# Patient Record
Sex: FEMALE | URBAN - METROPOLITAN AREA
[De-identification: names, ages, dates, MRNs, and addresses within clinical notes are randomized per-mention and may not be internally consistent; named-entity substitution may affect disease eponyms.]

---

## 2018-02-01 ENCOUNTER — APPOINTMENT (OUTPATIENT)
Age: 68
Setting detail: DERMATOLOGY
End: 2018-02-01

## 2018-02-01 DIAGNOSIS — Z41.9 ENCOUNTER FOR PROCEDURE FOR PURPOSES OTHER THAN REMEDYING HEALTH STATE, UNSPECIFIED: ICD-10-CM

## 2018-02-01 PROCEDURE — OTHER FILLERS (CC): OTHER

## 2019-02-21 ENCOUNTER — APPOINTMENT (OUTPATIENT)
Age: 69
Setting detail: DERMATOLOGY
End: 2019-02-21

## 2019-02-21 DIAGNOSIS — Z41.9 ENCOUNTER FOR PROCEDURE FOR PURPOSES OTHER THAN REMEDYING HEALTH STATE, UNSPECIFIED: ICD-10-CM

## 2019-02-21 PROCEDURE — OTHER ADDITIONAL NOTES: OTHER

## 2019-02-21 PROCEDURE — OTHER JUVEDERM ULTRA PLUS XC INJECTION: OTHER

## 2019-02-21 ASSESSMENT — LOCATION DETAILED DESCRIPTION DERM
LOCATION DETAILED: LEFT LOWER CUTANEOUS LIP
LOCATION DETAILED: LEFT CENTRAL BUCCAL CHEEK
LOCATION DETAILED: RIGHT CENTRAL BUCCAL CHEEK
LOCATION DETAILED: RIGHT MEDIAL BUCCAL CHEEK

## 2019-02-21 ASSESSMENT — LOCATION SIMPLE DESCRIPTION DERM
LOCATION SIMPLE: RIGHT CHEEK
LOCATION SIMPLE: LEFT CHEEK
LOCATION SIMPLE: LEFT LIP

## 2019-02-21 ASSESSMENT — LOCATION ZONE DERM
LOCATION ZONE: FACE
LOCATION ZONE: LIP

## 2019-02-21 NOTE — PROCEDURE: ADDITIONAL NOTES
Additional Notes: Suggested  cheeks as well to help marionette lines. Pt declined and would like filler right in lower face region.
Detail Level: Simple

## 2019-02-21 NOTE — PROCEDURE: JUVEDERM ULTRA PLUS XC INJECTION
Anesthesia Type: 1% lidocaine with epinephrine
[place] Filler  Volume In Cc: 0
Use Map Statement For Sites (Optional): No
Topical Anesthesia?: LMX
Procedural Text: The filler was administered to the treatment areas noted above.
Map Statment: See Attach Map for Complete Details
Filler: Juvederm Ultra Plus XC
Anesthesia Volume In Cc: 0.5
Consent: Written consent obtained. Risks include but not limited to bruising, beading, irregular texture, ulceration, infection, allergic reaction, scar formation, incomplete augmentation, temporary nature, procedural pain.
Additional Anesthesia Volume In Cc: 6
Detail Level: Detailed
Post-Care Instructions: Patient instructed to apply ice to reduce swelling.

## 2021-05-05 ENCOUNTER — APPOINTMENT (OUTPATIENT)
Dept: URBAN - METROPOLITAN AREA CLINIC 285 | Age: 71
Setting detail: DERMATOLOGY
End: 2021-05-05

## 2021-05-05 DIAGNOSIS — Z41.9 ENCOUNTER FOR PROCEDURE FOR PURPOSES OTHER THAN REMEDYING HEALTH STATE, UNSPECIFIED: ICD-10-CM

## 2021-05-05 PROCEDURE — OTHER ADDITIONAL NOTES: OTHER

## 2021-05-05 PROCEDURE — OTHER MIPS QUALITY: OTHER

## 2021-05-05 PROCEDURE — OTHER COSMETIC CONSULTATION: FILLERS: OTHER

## 2021-05-05 NOTE — PROCEDURE: ADDITIONAL NOTES
Render Risk Assessment In Note?: no
Additional Notes: Discussed in great detail the risk and benefits of filler after a recent COVID-19 vaccine including but not limited to edema, erythema, foreign body reaction and vasoconstriction. She just had her last vaccine about 3 weeks ago, didn't know the exact date. 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
Detail Level: Simple

## 2021-08-12 ENCOUNTER — APPOINTMENT (OUTPATIENT)
Dept: URBAN - METROPOLITAN AREA CLINIC 285 | Age: 71
Setting detail: DERMATOLOGY
End: 2021-08-12

## 2021-08-12 DIAGNOSIS — Z41.9 ENCOUNTER FOR PROCEDURE FOR PURPOSES OTHER THAN REMEDYING HEALTH STATE, UNSPECIFIED: ICD-10-CM

## 2021-08-12 PROCEDURE — OTHER JUVEDERM ULTRA PLUS XC INJECTION: OTHER

## 2021-08-12 PROCEDURE — OTHER ADDITIONAL NOTES: OTHER

## 2021-08-12 ASSESSMENT — LOCATION SIMPLE DESCRIPTION DERM
LOCATION SIMPLE: LEFT LIP
LOCATION SIMPLE: RIGHT CHEEK
LOCATION SIMPLE: LEFT CHEEK

## 2021-08-12 ASSESSMENT — LOCATION ZONE DERM
LOCATION ZONE: FACE
LOCATION ZONE: LIP

## 2021-08-12 ASSESSMENT — LOCATION DETAILED DESCRIPTION DERM
LOCATION DETAILED: RIGHT MEDIAL BUCCAL CHEEK
LOCATION DETAILED: LEFT LOWER CUTANEOUS LIP
LOCATION DETAILED: RIGHT CENTRAL BUCCAL CHEEK
LOCATION DETAILED: LEFT CENTRAL BUCCAL CHEEK